# Patient Record
Sex: FEMALE | Race: BLACK OR AFRICAN AMERICAN | NOT HISPANIC OR LATINO | URBAN - METROPOLITAN AREA
[De-identification: names, ages, dates, MRNs, and addresses within clinical notes are randomized per-mention and may not be internally consistent; named-entity substitution may affect disease eponyms.]

---

## 2020-05-27 ENCOUNTER — NURSE TRIAGE (OUTPATIENT)
Dept: NURSING | Facility: CLINIC | Age: 20
End: 2020-05-27

## 2020-05-27 NOTE — TELEPHONE ENCOUNTER
Patient reports for past 3-4 days can't eat at all. States her body keeps rejecting food. Reports her breasts are sagging and can see her ribs. Reports she wants to gain weight back. Patient is unsure how much weight she has lost. Does not have a scale. No vomiting but spits food up. Food tastes nasty (reports it's the food she normally eats also)  Is able to drink water and any type of fluid.   No dizziness.   Feels starving/hungry  Is stressed lately  Feels nauseated when putting food into mouth  Last urination few hours ago  Denies pregnancy- had period 3-4 days ago   Patient goes to [facility] who offered virtual visit. Patient states she wants to see a doctor in person. Advised she could go into an urgent care. OR advised patient can do virtual appointment and talk with doctor over phone to get further recommendations and treatment (possibly medications) to help with the nausea. Patient verbalized understanding and had no further questions.    Lakeisha Dixon RN/[facility]         Reason for Disposition    [1] Losing weight AND [2] cause unknown    Additional Information    Negative: Bottle-feeding (Formula) questions    Negative: Breast-feeding questions    Negative: Solid food (baby food) questions    Negative: Vomiting is the main concern    Negative: [1] Anorexia nervosa patient AND [2] has become worse    Negative: Fever > 104 F (40 C)    Negative: [1] Fever > 101 F (38.3 C) AND [2] age > 60    Negative: [1] Fever > 100.0 F (37.8 C) AND [2] bedridden (e.g., nursing home patient, CVA, chronic illness, recovering from surgery)    Negative: [1] Fever > 100.0 F (37.8 C) AND [2] diabetes mellitus or weak immune system (e.g., HIV positive, cancer chemo, splenectomy, organ transplant, chronic steroids)    Negative: Taking any of the following medications: digoxin (Lanoxin), lithium, theophylline, phenytoin (Dilantin)    Negative: Unable to walk, or can only walk with assistance  (e.g., requires support)    Negative: Difficulty breathing    Negative: [1] Nausea or vomiting AND [2] pregnancy < 20 weeks    Negative: Menstrual Period - Missed or Late (i.e., pregnancy suspected)    Negative: Heat exhaustion suspected (i.e., dehydration from heat exposure)    Negative: Motion sickness suspected (i.e., nausea with car, plane, boat, or train travel)    Negative: Anxiety or stress suspected (i.e., nausea with anxiety attacks or stressful situations)    Negative: Traumatic Brain Injury (TBI) suspected    Negative: Nausea (or Vomiting) in a cancer patient who is currently (or recently) receiving chemotherapy or radiation therapy, or cancer patient who has metastatic or end-stage cancer and is receiving palliative care    Negative: Vomiting occurs    Negative: Other symptom is present, see that guideline.  (e.g., chest pain, headache, dizziness, abdominal pain, colds, sore throat, etc.).    Negative: Shock suspected (e.g., cold/pale/clammy skin, too weak to stand, low BP, rapid pulse)    Negative: Sounds like a life-threatening emergency to the triager    Negative: [1] Insulin-dependent diabetes (Type I) AND [2] glucose > 400 mg/dl (22 mmol/l)    Negative: [1] Drinking very little AND [2] dehydration suspected (e.g., no urine > 12 hours, very dry mouth, very lightheaded)    Negative: Patient sounds very sick or weak to the triager    Negative: Yellowish color of the skin or white of the eye (i.e., jaundice)    Negative: Fever present > 3 days (72 hours)    Negative: Receiving cancer chemotherapy medication    Negative: Taking prescription medication that could cause nausea (e.g., narcotics/opiates, antibiotics, OCPs, many others)    Nausea lasts > 1 week    Protocols used: EATING [id number]-TZ, NAUSEA-A-AH